# Patient Record
Sex: FEMALE | Race: WHITE
[De-identification: names, ages, dates, MRNs, and addresses within clinical notes are randomized per-mention and may not be internally consistent; named-entity substitution may affect disease eponyms.]

---

## 2019-08-14 ENCOUNTER — HOSPITAL ENCOUNTER (OUTPATIENT)
Dept: HOSPITAL 53 - M OPP | Age: 69
Discharge: HOME | End: 2019-08-14
Attending: INTERNAL MEDICINE
Payer: MEDICARE

## 2019-08-14 VITALS — WEIGHT: 148 LBS | HEIGHT: 64 IN | BODY MASS INDEX: 25.27 KG/M2

## 2019-08-14 VITALS — DIASTOLIC BLOOD PRESSURE: 65 MMHG | SYSTOLIC BLOOD PRESSURE: 142 MMHG

## 2019-08-14 DIAGNOSIS — Z12.11: Primary | ICD-10-CM

## 2019-08-14 DIAGNOSIS — Z88.2: ICD-10-CM

## 2019-08-14 DIAGNOSIS — Z88.8: ICD-10-CM

## 2019-08-14 DIAGNOSIS — R12: ICD-10-CM

## 2019-08-14 DIAGNOSIS — Z79.899: ICD-10-CM

## 2019-08-14 DIAGNOSIS — Z88.0: ICD-10-CM

## 2019-08-14 DIAGNOSIS — K64.0: ICD-10-CM

## 2019-08-14 DIAGNOSIS — Z79.82: ICD-10-CM

## 2019-08-14 DIAGNOSIS — K57.30: ICD-10-CM

## 2019-08-14 DIAGNOSIS — K31.89: ICD-10-CM

## 2019-08-14 PROCEDURE — 43239 EGD BIOPSY SINGLE/MULTIPLE: CPT

## 2019-08-14 PROCEDURE — 88305 TISSUE EXAM BY PATHOLOGIST: CPT

## 2019-08-14 NOTE — ROOR
________________________________________________________________________________

Patient Name: Saranya Boothe               Procedure Date: 8/14/2019 8:39 AM

MRN: [id number]                          Account Number: [id number]

YOB: 1950               Age: 69

Room: [facility]                            Gender: Female

Note Status: Finalized                 

________________________________________________________________________________

 

Procedure:           Upper Endoscopy + Biopsies

Indications:         Heartburn, Exclusion of Hammer's esophagus

Providers:           Dylan Godfrey MD

Referring MD:        Erasto Soto MD

Requesting Provider: 

Medicines:           Monitored Anesthesia Care

Complications:       No immediate complications.

________________________________________________________________________________

Procedure:           Pre-Anesthesia Assessment:

                     - The heart rate, respiratory rate, oxygen saturations, 

                     blood pressure, adequacy of pulmonary ventilation, and 

                     response to care were monitored throughout the procedure.

                     The Endoscope was introduced through the mouth, and 

                     advanced to the second part of duodenum. The upper GI 

                     endoscopy was accomplished without difficulty. The 

                     patient tolerated the procedure well.

                                                                                

Findings:

     The Z-line was regular and was found 40 cm from the incisors. Multiple 

     biopsies were obtained with cold forceps for evaluation to rule out 

     Hammer's Esophagus randomly at the gastroesophageal junction.

     Localized mildly erythematous mucosa without bleeding was found in the 

     gastric antrum. Biopsies were taken with a cold forceps for Helicobacter 

     pylori testing.

     The exam of the duodenum was otherwise normal.

                                                                                

Impression:          - Z-line regular, 40 cm from the incisors.

                     - Erythematous mucosa in the antrum. Biopsied.

                     - Multiple biopsies were obtained at the gastroesophageal 

                     junction.

                     - The examination was otherwise normal.

Recommendation:      - Patient has a contact number available for emergencies. 

                     The signs and symptoms of potential delayed complications 

                     were discussed with the patient. Return to normal 

                     activities tomorrow. Written discharge instructions were 

                     provided to the patient.

                     - High fiber diet.

                     - Discharge patient to home.

                     - Follow an antireflux regimen.

                     - Continue present medications.

                     - Await pathology results.

                     - Telephone GI clinic for pathology results in 1 week.

                     - Return to referring physician.

                     - The findings and recommendations were discussed with 

                     the patient's family.

                                                                                

 

Dylan Godfrey MD

____________________

Dylan Godfrey MD

8/14/2019 8:56:23 AM

Electronically signed by Dylan Godfrey MD

Number of Addenda: 0

 

Note Initiated On: 8/14/2019 8:39 AM

Estimated Blood Loss:

     Estimated blood loss: none.

## 2019-08-14 NOTE — ROOR
________________________________________________________________________________

Patient Name: Saranya Boothe               Procedure Date: 8/14/2019 8:40 AM

MRN: [id number]                          Account Number: [id number]

YOB: 1950               Age: 69

Room: [facility]                            Gender: Female

Note Status: Finalized                 

________________________________________________________________________________

 

Procedure:           Total Colonoscopy to Cecum

Indications:         Screening for colorectal malignant neoplasm

Providers:           Dylan Godfrey MD

Referring MD:        Erasto Soto MD

Requesting Provider: 

Medicines:           Monitored Anesthesia Care

Complications:       No immediate complications.

________________________________________________________________________________

Procedure:           Pre-Anesthesia Assessment:

                     - The heart rate, respiratory rate, oxygen saturations, 

                     blood pressure, adequacy of pulmonary ventilation, and 

                     response to care were monitored throughout the procedure.

                     The Colonoscope was introduced through the anus and 

                     advanced to the cecum, identified by appendiceal orifice 

                     and ileocecal valve. The colonoscopy was performed 

                     without difficulty. The patient tolerated the procedure 

                     well. The quality of the bowel preparation was excellent.

                                                                                

Findings:

     The perianal and digital rectal examinations were normal.

     Non-bleeding internal hemorrhoids were found during retroflexion. The 

     hemorrhoids were small and Grade I (internal hemorrhoids that do not 

     prolapse).

     Multiple small and large-mouthed diverticula were found in the 

     recto-sigmoid colon, sigmoid colon and descending colon.

     The exam was otherwise without abnormality on direct and retroflexion 

     views.

                                                                                

Impression:          - Non-bleeding internal hemorrhoids.

                     - Diverticulosis in the recto-sigmoid colon, in the 

                     sigmoid colon and in the descending colon.

                     - The examination was otherwise normal on direct and 

                     retroflexion views.

                     - No specimens collected.

                     - The exam was otherwise normal to the cecum.

Recommendation:      - Patient has a contact number available for emergencies. 

                     The signs and symptoms of potential delayed complications 

                     were discussed with the patient. Return to normal 

                     activities tomorrow. Written discharge instructions were 

                     provided to the patient.

                     - High fiber diet.

                     - Discharge patient to home.

                     - Continue present medications.

                     - Repeat colonoscopy in 10 years for screening purposes.

                     - Return to referring physician.

                     - The findings and recommendations were discussed with 

                     the patient's family.

                                                                                

 

Dylan Godfrey MD

____________________

Dylan Godfrey MD

8/14/2019 9:09:30 AM

Electronically signed by Dylan Godfrey MD

Number of Addenda: 0

 

Note Initiated On: 8/14/2019 8:40 AM

Estimated Blood Loss:

     Estimated blood loss: none.